# Patient Record
Sex: MALE | Race: WHITE | NOT HISPANIC OR LATINO | Employment: FULL TIME | URBAN - METROPOLITAN AREA
[De-identification: names, ages, dates, MRNs, and addresses within clinical notes are randomized per-mention and may not be internally consistent; named-entity substitution may affect disease eponyms.]

---

## 2017-12-22 ENCOUNTER — MEDICAL CORRESPONDENCE (OUTPATIENT)
Dept: HEALTH INFORMATION MANAGEMENT | Facility: CLINIC | Age: 57
End: 2017-12-22

## 2021-07-23 ENCOUNTER — LAB REQUISITION (OUTPATIENT)
Dept: LAB | Facility: CLINIC | Age: 61
End: 2021-07-23
Payer: COMMERCIAL

## 2021-07-23 DIAGNOSIS — I10 ESSENTIAL (PRIMARY) HYPERTENSION: ICD-10-CM

## 2021-07-23 DIAGNOSIS — E11.65 TYPE 2 DIABETES MELLITUS WITH HYPERGLYCEMIA (H): ICD-10-CM

## 2021-07-23 LAB
CHOLEST SERPL-MCNC: 135 MG/DL
FASTING STATUS PATIENT QL REPORTED: NORMAL
HDLC SERPL-MCNC: 45 MG/DL
LDLC SERPL CALC-MCNC: 65 MG/DL
NONHDLC SERPL-MCNC: 90 MG/DL
TRIGL SERPL-MCNC: 123 MG/DL

## 2021-07-23 PROCEDURE — 80061 LIPID PANEL: CPT | Mod: ORL | Performed by: INTERNAL MEDICINE

## 2021-09-13 ENCOUNTER — LAB REQUISITION (OUTPATIENT)
Dept: LAB | Facility: CLINIC | Age: 61
End: 2021-09-13
Payer: COMMERCIAL

## 2021-09-13 DIAGNOSIS — Z12.11 ENCOUNTER FOR SCREENING FOR MALIGNANT NEOPLASM OF COLON: ICD-10-CM

## 2021-09-13 DIAGNOSIS — I10 ESSENTIAL (PRIMARY) HYPERTENSION: ICD-10-CM

## 2021-09-13 DIAGNOSIS — E11.65 TYPE 2 DIABETES MELLITUS WITH HYPERGLYCEMIA (H): ICD-10-CM

## 2021-09-13 LAB — HEMOCCULT STL QL IA: NEGATIVE

## 2021-09-13 PROCEDURE — 82274 ASSAY TEST FOR BLOOD FECAL: CPT | Mod: ORL | Performed by: INTERNAL MEDICINE

## 2022-08-05 ENCOUNTER — HOSPITAL ENCOUNTER (OUTPATIENT)
Dept: GENERAL RADIOLOGY | Facility: CLINIC | Age: 62
Discharge: HOME | End: 2022-08-05
Attending: PEDIATRICS | Admitting: PEDIATRICS
Payer: COMMERCIAL

## 2022-08-05 DIAGNOSIS — R05.9 COUGH: ICD-10-CM

## 2022-08-05 PROCEDURE — 71046 X-RAY EXAM CHEST 2 VIEWS: CPT

## 2022-10-28 ENCOUNTER — LAB REQUISITION (OUTPATIENT)
Dept: LAB | Facility: CLINIC | Age: 62
End: 2022-10-28
Payer: COMMERCIAL

## 2022-10-28 DIAGNOSIS — E11.65 TYPE 2 DIABETES MELLITUS WITH HYPERGLYCEMIA (H): ICD-10-CM

## 2022-10-28 LAB
CHOLEST SERPL-MCNC: 128 MG/DL
HDLC SERPL-MCNC: 45 MG/DL
LDLC SERPL CALC-MCNC: 54 MG/DL
NONHDLC SERPL-MCNC: 83 MG/DL
TRIGL SERPL-MCNC: 144 MG/DL

## 2022-10-28 PROCEDURE — 80061 LIPID PANEL: CPT | Mod: ORL | Performed by: INTERNAL MEDICINE

## 2023-01-20 ENCOUNTER — LAB REQUISITION (OUTPATIENT)
Dept: LAB | Facility: CLINIC | Age: 63
End: 2023-01-20
Payer: COMMERCIAL

## 2023-01-20 DIAGNOSIS — E11.65 TYPE 2 DIABETES MELLITUS WITH HYPERGLYCEMIA (H): ICD-10-CM

## 2023-01-20 LAB
CREAT UR-MCNC: 46 MG/DL
MICROALBUMIN UR-MCNC: <12 MG/L
MICROALBUMIN/CREAT UR: NORMAL MG/G{CREAT}

## 2023-01-20 PROCEDURE — 82570 ASSAY OF URINE CREATININE: CPT | Mod: ORL | Performed by: INTERNAL MEDICINE

## 2023-07-16 ENCOUNTER — APPOINTMENT (OUTPATIENT)
Dept: CT IMAGING | Facility: CLINIC | Age: 63
End: 2023-07-16
Attending: EMERGENCY MEDICINE
Payer: COMMERCIAL

## 2023-07-16 ENCOUNTER — APPOINTMENT (OUTPATIENT)
Dept: GENERAL RADIOLOGY | Facility: CLINIC | Age: 63
End: 2023-07-16
Attending: EMERGENCY MEDICINE
Payer: COMMERCIAL

## 2023-07-16 ENCOUNTER — HOSPITAL ENCOUNTER (EMERGENCY)
Facility: CLINIC | Age: 63
Discharge: HOME | End: 2023-07-17
Attending: EMERGENCY MEDICINE | Admitting: EMERGENCY MEDICINE
Payer: COMMERCIAL

## 2023-07-16 DIAGNOSIS — N39.0 URINARY TRACT INFECTION WITHOUT HEMATURIA, SITE UNSPECIFIED: ICD-10-CM

## 2023-07-16 DIAGNOSIS — N20.0 KIDNEY STONE: ICD-10-CM

## 2023-07-16 DIAGNOSIS — S39.012A LOW BACK STRAIN, INITIAL ENCOUNTER: ICD-10-CM

## 2023-07-16 LAB
ALBUMIN UR-MCNC: NEGATIVE MG/DL
APPEARANCE UR: CLEAR
BILIRUB UR QL STRIP: NEGATIVE
COLOR UR AUTO: ABNORMAL
GLUCOSE UR STRIP-MCNC: >=1000 MG/DL
HGB UR QL STRIP: ABNORMAL
KETONES UR STRIP-MCNC: NEGATIVE MG/DL
LEUKOCYTE ESTERASE UR QL STRIP: ABNORMAL
NITRATE UR QL: NEGATIVE
PH UR STRIP: 5 [PH] (ref 5–7)
RBC URINE: 9 /HPF
SP GR UR STRIP: 1.01 (ref 1–1.03)
SQUAMOUS EPITHELIAL: <1 /HPF
UROBILINOGEN UR STRIP-MCNC: NORMAL MG/DL
WBC URINE: 34 /HPF

## 2023-07-16 PROCEDURE — 96372 THER/PROPH/DIAG INJ SC/IM: CPT | Performed by: EMERGENCY MEDICINE

## 2023-07-16 PROCEDURE — 87086 URINE CULTURE/COLONY COUNT: CPT | Performed by: EMERGENCY MEDICINE

## 2023-07-16 PROCEDURE — 74176 CT ABD & PELVIS W/O CONTRAST: CPT

## 2023-07-16 PROCEDURE — 81001 URINALYSIS AUTO W/SCOPE: CPT | Performed by: EMERGENCY MEDICINE

## 2023-07-16 PROCEDURE — 250N000013 HC RX MED GY IP 250 OP 250 PS 637: Performed by: EMERGENCY MEDICINE

## 2023-07-16 PROCEDURE — 250N000011 HC RX IP 250 OP 636: Mod: JZ | Performed by: EMERGENCY MEDICINE

## 2023-07-16 PROCEDURE — 72100 X-RAY EXAM L-S SPINE 2/3 VWS: CPT

## 2023-07-16 PROCEDURE — 99285 EMERGENCY DEPT VISIT HI MDM: CPT | Mod: 25

## 2023-07-16 RX ORDER — LIDOCAINE 4 G/G
1 PATCH TOPICAL ONCE
Status: DISCONTINUED | OUTPATIENT
Start: 2023-07-16 | End: 2023-07-17 | Stop reason: HOSPADM

## 2023-07-16 RX ORDER — OXYCODONE HYDROCHLORIDE 5 MG/1
5 TABLET ORAL ONCE
Status: COMPLETED | OUTPATIENT
Start: 2023-07-16 | End: 2023-07-16

## 2023-07-16 RX ORDER — KETOROLAC TROMETHAMINE 15 MG/ML
30 INJECTION, SOLUTION INTRAMUSCULAR; INTRAVENOUS ONCE
Status: COMPLETED | OUTPATIENT
Start: 2023-07-16 | End: 2023-07-16

## 2023-07-16 RX ORDER — CYCLOBENZAPRINE HCL 10 MG
10 TABLET ORAL ONCE
Status: COMPLETED | OUTPATIENT
Start: 2023-07-16 | End: 2023-07-16

## 2023-07-16 RX ADMIN — LIDOCAINE 1 PATCH: 560 PATCH PERCUTANEOUS; TOPICAL; TRANSDERMAL at 22:11

## 2023-07-16 RX ADMIN — KETOROLAC TROMETHAMINE 30 MG: 15 INJECTION, SOLUTION INTRAMUSCULAR; INTRAVENOUS at 22:11

## 2023-07-16 RX ADMIN — CYCLOBENZAPRINE 10 MG: 10 TABLET, FILM COATED ORAL at 22:12

## 2023-07-16 RX ADMIN — OXYCODONE HYDROCHLORIDE 5 MG: 5 TABLET ORAL at 22:19

## 2023-07-16 ASSESSMENT — ACTIVITIES OF DAILY LIVING (ADL)
ADLS_ACUITY_SCORE: 33
ADLS_ACUITY_SCORE: 35

## 2023-07-16 NOTE — LETTER
July 17, 2023      To Whom It May Concern:      Michael Langley was seen in our Emergency Department today, 07/17/23.  I expect his condition to improve over the next 5 days.  He may return to work/school when improved.    Sincerely,      Abhijit Zuniga MD

## 2023-07-17 VITALS
DIASTOLIC BLOOD PRESSURE: 82 MMHG | RESPIRATION RATE: 18 BRPM | OXYGEN SATURATION: 97 % | HEART RATE: 99 BPM | BODY MASS INDEX: 26.01 KG/M2 | SYSTOLIC BLOOD PRESSURE: 127 MMHG | WEIGHT: 192 LBS | HEIGHT: 72 IN | TEMPERATURE: 97.7 F

## 2023-07-17 RX ORDER — CEPHALEXIN 500 MG/1
500 CAPSULE ORAL 2 TIMES DAILY
Qty: 10 CAPSULE | Refills: 0 | Status: SHIPPED | OUTPATIENT
Start: 2023-07-17 | End: 2023-07-22

## 2023-07-17 RX ORDER — NAPROXEN 500 MG/1
500 TABLET ORAL 2 TIMES DAILY WITH MEALS
Qty: 24 TABLET | Refills: 0 | Status: SHIPPED | OUTPATIENT
Start: 2023-07-17 | End: 2023-07-25

## 2023-07-17 RX ORDER — CYCLOBENZAPRINE HCL 10 MG
10 TABLET ORAL 3 TIMES DAILY PRN
Qty: 20 TABLET | Refills: 0 | Status: SHIPPED | OUTPATIENT
Start: 2023-07-17 | End: 2023-07-23

## 2023-07-17 NOTE — ED TRIAGE NOTES
Patient states had MRI at [facility].  Scheduled to have surgery later this month on left shoulder due to severe arthritis.    Now having severe lower back pain.  Denies injury.  Bilateral.   Denies difficulty with urination & no incontinence.      Triage Assessment     Row Name 07/16/23 1954       Triage Assessment (Adult)    Airway WDL WDL       Respiratory WDL    Respiratory WDL WDL       Skin Circulation/Temperature WDL    Skin Circulation/Temperature WDL WDL       Cardiac WDL    Cardiac WDL WDL       Peripheral/Neurovascular WDL    Peripheral Neurovascular WDL WDL       Cognitive/Neuro/Behavioral WDL    Cognitive/Neuro/Behavioral WDL WDL

## 2023-07-17 NOTE — ED PROVIDER NOTES
History     Chief Complaint:  Back Pain       The history is provided by the patient.      Michael Langley is a 62 year old male with history of type 2 diabetes who presents with lower left back pain for 2 days. The patient went to [facility] for his left shoulder pain and will be getting surgery soon. After his visit, he tried sleeping but was struggling. He has been sleeping on his right side due to his shoulder. He woke up yesterday with stiffness. He tried walking around and stretching and had no relief. He states that today the pain is worse. He is struggling to walk and is unable to lie down in bed. He tried taking aleve and has had no relief. No urination symptoms. No numbness or tingling, denies bowel or bladder incontinence.  Denies any weakness.    Independent Historian:   None - Patient Only    Review of External Notes:   I reviewed his sports medicine notes and office visit notes from this month      Medications:    Oxycodone   Metformin    Past Medical History:    Typ 2 diabetes     Past Surgical History:    Patient denies any past surgical history    Physical Exam     Patient Vitals for the past 24 hrs:   BP Temp Temp src Pulse Resp SpO2 Height Weight   07/16/23 1953 (!) 158/86 97.7  F (36.5  C) Temporal 99 14 100 % 1.829 m (6') 87.1 kg (192 lb)        Physical Exam  General: Alert, interactive in mild distress  Head:  Scalp is atraumatic  Eyes:  The pupils are equal, round, and reactive to light    EOM's intact    No scleral icterus  ENT:      Nose:  The external nose is normal  Ears:  External ears are normal  Mouth/Throat: The oropharynx is normal    Mucus membranes are moist      Neck:  Normal range of motion.      There is no rigidity.    Trachea is in the midline         CV:  Regular rate and rhythm    No murmur   Resp:  Breath sounds are clear bilaterally    Non-labored, no retractions or accessory muscle use      GI:  Abdomen is soft, no distension, no tenderness.       MS:  Normal strength in all  4 extremities, Tenderness to palpations in the left lumbosacral area. Negative straight leg test bilaterally. 2+ patellar and achilles reflexes.   Skin:  Warm and dry, No rash or lesions noted.  Neuro: Strength 5/5 x4.  Sensation intact  In all 4 extremities.      GCS: 15  Psych:  Awake. Alert.  Normal affect.      Appropriate interactions.      Emergency Department Course     Imaging:  CT Abdomen Pelvis w/o Contrast   Final Result   IMPRESSION:    1.  Nonobstructing left nephrolithiasis.         XR Lumbar Spine 2/3 Views   Final Result   FINDINGS: Nomenclature based on 5 lumbar type vertebral bodies. Mild thoracolumbar levocurvature. Normal sagittal alignment. Normal height of vertebral bodies. No significant interbody degeneration. Mild multi level facet arthropathy. Unremarkable   visualized bony pelvis. Abdominal soft tissues are unremarkable.      Report per radiology    Laboratory:  Labs Ordered and Resulted from Time of ED Arrival to Time of ED Departure   ROUTINE UA WITH MICROSCOPIC REFLEX TO CULTURE - Abnormal       Result Value    Color Urine Light Yellow      Appearance Urine Clear      Glucose Urine >=1000 (*)     Bilirubin Urine Negative      Ketones Urine Negative      Specific Gravity Urine 1.015      Blood Urine Trace (*)     pH Urine 5.0      Protein Albumin Urine Negative      Urobilinogen Urine Normal      Nitrite Urine Negative      Leukocyte Esterase Urine Large (*)     RBC Urine 9 (*)     WBC Urine 34 (*)     Squamous Epithelials Urine <1     URINE CULTURE      Emergency Department Course & Assessments:       Interventions:  Medications   Lidocaine (LIDOCARE) 4 % Patch 1 patch (1 patch Transdermal $Patch/Med Applied 7/16/23 2211)   ketorolac (TORADOL) injection 30 mg (30 mg Intramuscular $Given 7/16/23 2211)   oxyCODONE (ROXICODONE) tablet 5 mg (5 mg Oral Not Given 7/16/23 2212)   cyclobenzaprine (FLEXERIL) tablet 10 mg (10 mg Oral $Given 7/16/23 2212)   oxyCODONE (ROXICODONE) tablet 5 mg (5 mg  Oral $Given 7/16/23 6189)        Assessments:  2145 I examined the patient and obtained the history above    Independent Interpretation (X-rays, CTs, rhythm strip):  I reviewed the patient's lumbar x-ray, no obvious fractures or malalignment noted    Consultations/Discussion of Management or Tests:  None        Social Determinants of Health affecting care:   None    Disposition:  The patient was discharged to home.     Impression & Plan      Medical Decision Making:  Following presentation history and physical examination were performed, the above work-up was undertaken.  Given the pyuria CT imaging was performed to evaluate for ureteral lithiasis thankfully no obstructing stones are noted and I doubt a septic stone.  I will empirically treat with cephalexin for possible UTI.  I favor a muscular/skeletal back strain given that the patient has been favoring his left side secondary to his shoulder issues.  There is no signs of cauda equina syndrome, discitis, epidural abscess, or more concerning illness.  I will place him on the medications below and recommended follow-up with his primary care provider and have provided the number for spine surgeon recommended return if new symptoms develop.  Patient was in agreement this plan and subsequently discharged.    Diagnosis:    ICD-10-CM    1. Low back strain, initial encounter  S39.012A       2. Urinary tract infection without hematuria, site unspecified  N39.0       3. Kidney stone  N20.0            Discharge Medications:  Discharge Medication List as of 7/17/2023 12:17 AM      START taking these medications    Details   cephALEXin (KEFLEX) 500 MG capsule Take 1 capsule (500 mg) by mouth 2 times daily for 5 days, Disp-10 capsule, R-0, E-Prescribe      cyclobenzaprine (FLEXERIL) 10 MG tablet Take 1 tablet (10 mg) by mouth 3 times daily as needed for muscle spasms, Disp-20 tablet, R-0, E-Prescribe      naproxen (NAPROSYN) 500 MG tablet Take 1 tablet (500 mg) by mouth 2  times daily (with meals) for 8 days, Disp-24 tablet, R-0, E-Prescribe                Scribe Disclosure:  I, JOSE, am serving as a scribe at 10:05 PM on 7/16/2023 to document services personally performed by Abhijit Zuniga MD based on my observations and the provider's statements to me.   7/16/2023   Abhijit Zuniga MD Trigger, Brandon Thomas, MD  07/17/23 0055

## 2023-07-18 LAB — BACTERIA UR CULT: NO GROWTH

## 2024-07-29 ENCOUNTER — LAB REQUISITION (OUTPATIENT)
Dept: LAB | Facility: CLINIC | Age: 64
End: 2024-07-29
Payer: COMMERCIAL

## 2024-07-29 DIAGNOSIS — E78.2 MIXED HYPERLIPIDEMIA: ICD-10-CM

## 2024-07-29 DIAGNOSIS — Z12.11 ENCOUNTER FOR SCREENING FOR MALIGNANT NEOPLASM OF COLON: ICD-10-CM

## 2024-07-29 DIAGNOSIS — E11.65 TYPE 2 DIABETES MELLITUS WITH HYPERGLYCEMIA (H): ICD-10-CM

## 2024-07-29 LAB
CHOLEST SERPL-MCNC: 174 MG/DL
CREAT UR-MCNC: 42.5 MG/DL
FASTING STATUS PATIENT QL REPORTED: YES
HDLC SERPL-MCNC: 50 MG/DL
LDLC SERPL CALC-MCNC: 103 MG/DL
MICROALBUMIN UR-MCNC: <12 MG/L
MICROALBUMIN/CREAT UR: NORMAL MG/G{CREAT}
NONHDLC SERPL-MCNC: 124 MG/DL
TRIGL SERPL-MCNC: 107 MG/DL

## 2024-07-29 PROCEDURE — 80061 LIPID PANEL: CPT | Mod: ORL | Performed by: INTERNAL MEDICINE

## 2024-07-29 PROCEDURE — 82043 UR ALBUMIN QUANTITATIVE: CPT | Mod: ORL | Performed by: INTERNAL MEDICINE

## 2024-07-30 ENCOUNTER — TRANSCRIBE ORDERS (OUTPATIENT)
Dept: OTHER | Age: 64
End: 2024-07-30

## 2024-07-30 DIAGNOSIS — E11.65 TYPE 2 DIABETES MELLITUS WITH HYPERGLYCEMIA, WITHOUT LONG-TERM CURRENT USE OF INSULIN (H): Primary | ICD-10-CM

## 2024-08-14 ENCOUNTER — LAB REQUISITION (OUTPATIENT)
Dept: LAB | Facility: CLINIC | Age: 64
End: 2024-08-14
Payer: COMMERCIAL

## 2024-08-14 DIAGNOSIS — Z00.00 ENCOUNTER FOR GENERAL ADULT MEDICAL EXAMINATION WITHOUT ABNORMAL FINDINGS: ICD-10-CM

## 2024-08-14 PROCEDURE — 82274 ASSAY TEST FOR BLOOD FECAL: CPT | Mod: ORL | Performed by: INTERNAL MEDICINE

## 2024-08-15 LAB — HEMOCCULT STL QL IA: NEGATIVE

## 2025-03-25 ENCOUNTER — TRANSCRIBE ORDERS (OUTPATIENT)
Dept: OTHER | Age: 65
End: 2025-03-25

## 2025-03-25 ENCOUNTER — MEDICAL CORRESPONDENCE (OUTPATIENT)
Dept: HEALTH INFORMATION MANAGEMENT | Facility: CLINIC | Age: 65
End: 2025-03-25

## 2025-03-25 DIAGNOSIS — K21.9 GASTROESOPHAGEAL REFLUX DISEASE WITHOUT ESOPHAGITIS: Primary | ICD-10-CM

## 2025-07-25 ENCOUNTER — LAB REQUISITION (OUTPATIENT)
Dept: LAB | Facility: CLINIC | Age: 65
End: 2025-07-25
Payer: COMMERCIAL

## 2025-07-25 DIAGNOSIS — E11.65 TYPE 2 DIABETES MELLITUS WITH HYPERGLYCEMIA (H): ICD-10-CM

## 2025-07-25 DIAGNOSIS — E78.2 MIXED HYPERLIPIDEMIA: ICD-10-CM

## 2025-07-25 LAB
CHOLEST SERPL-MCNC: 128 MG/DL
CREAT UR-MCNC: 37.9 MG/DL
FASTING STATUS PATIENT QL REPORTED: YES
HDLC SERPL-MCNC: 48 MG/DL
LDLC SERPL CALC-MCNC: 65 MG/DL
MICROALBUMIN UR-MCNC: <12 MG/L
MICROALBUMIN/CREAT UR: NORMAL MG/G{CREAT}
NONHDLC SERPL-MCNC: 80 MG/DL
TRIGL SERPL-MCNC: 74 MG/DL

## 2025-07-25 PROCEDURE — 80061 LIPID PANEL: CPT | Mod: ORL | Performed by: INTERNAL MEDICINE

## 2025-07-25 PROCEDURE — 82043 UR ALBUMIN QUANTITATIVE: CPT | Mod: ORL | Performed by: INTERNAL MEDICINE
